# Patient Record
Sex: MALE | Race: WHITE | NOT HISPANIC OR LATINO | Employment: OTHER | URBAN - NONMETROPOLITAN AREA
[De-identification: names, ages, dates, MRNs, and addresses within clinical notes are randomized per-mention and may not be internally consistent; named-entity substitution may affect disease eponyms.]

---

## 2024-11-09 ENCOUNTER — OFFICE VISIT (OUTPATIENT)
Dept: URGENT CARE | Facility: PHYSICIAN GROUP | Age: 84
End: 2024-11-09
Payer: MEDICARE

## 2024-11-09 VITALS
HEIGHT: 68 IN | WEIGHT: 178.6 LBS | HEART RATE: 90 BPM | TEMPERATURE: 97.7 F | SYSTOLIC BLOOD PRESSURE: 140 MMHG | RESPIRATION RATE: 20 BRPM | DIASTOLIC BLOOD PRESSURE: 98 MMHG | BODY MASS INDEX: 27.07 KG/M2 | OXYGEN SATURATION: 98 %

## 2024-11-09 DIAGNOSIS — I10 HYPERTENSION, UNSPECIFIED TYPE: Primary | ICD-10-CM

## 2024-11-09 DIAGNOSIS — I10 ELEVATED BLOOD PRESSURE READING IN OFFICE WITH DIAGNOSIS OF HYPERTENSION: ICD-10-CM

## 2024-11-09 DIAGNOSIS — E78.5 HYPERLIPIDEMIA, UNSPECIFIED HYPERLIPIDEMIA TYPE: ICD-10-CM

## 2024-11-09 PROCEDURE — 3080F DIAST BP >= 90 MM HG: CPT

## 2024-11-09 PROCEDURE — 99204 OFFICE O/P NEW MOD 45 MIN: CPT

## 2024-11-09 PROCEDURE — 3077F SYST BP >= 140 MM HG: CPT

## 2024-11-09 RX ORDER — LOVASTATIN 10 MG/1
10 TABLET ORAL NIGHTLY
COMMUNITY
End: 2024-11-09 | Stop reason: SDUPTHER

## 2024-11-09 RX ORDER — LOSARTAN POTASSIUM 100 MG/1
100 TABLET ORAL DAILY
Qty: 30 EACH | Refills: 0 | Status: SHIPPED | OUTPATIENT
Start: 2024-11-09 | End: 2024-12-09

## 2024-11-09 RX ORDER — AMLODIPINE BESYLATE 5 MG/1
5 TABLET ORAL DAILY
COMMUNITY
End: 2024-11-09 | Stop reason: SDUPTHER

## 2024-11-09 RX ORDER — LOSARTAN POTASSIUM 100 MG/1
100 TABLET ORAL DAILY
COMMUNITY
End: 2024-11-09 | Stop reason: SDUPTHER

## 2024-11-09 RX ORDER — LOVASTATIN 10 MG/1
10 TABLET ORAL NIGHTLY
Qty: 30 TABLET | Refills: 0 | Status: SHIPPED | OUTPATIENT
Start: 2024-11-09 | End: 2024-12-09

## 2024-11-09 RX ORDER — AMLODIPINE BESYLATE 5 MG/1
5 TABLET ORAL DAILY
Qty: 30 TABLET | Refills: 0 | Status: SHIPPED | OUTPATIENT
Start: 2024-11-09 | End: 2024-12-09

## 2024-11-09 ASSESSMENT — ENCOUNTER SYMPTOMS
EYE REDNESS: 0
STRIDOR: 0
DIARRHEA: 0
HEADACHES: 0
VOMITING: 0
EYE DISCHARGE: 0
NAUSEA: 0
SORE THROAT: 0
WHEEZING: 0
FEVER: 0
DIZZINESS: 0
COUGH: 0
PALPITATIONS: 0
EYE PAIN: 0
SHORTNESS OF BREATH: 0
SPUTUM PRODUCTION: 0
MYALGIAS: 0
CHILLS: 0
ABDOMINAL PAIN: 0

## 2024-11-09 NOTE — PROGRESS NOTES
Subjective:   Lucas Garcia is a 84 y.o. male who presents for Medication Refill (Amlodipine 5 mg/Lovastatin 10 mg /Losartan 100 mg /Pt has been out week )          I introduced myself to the patient and informed them that I am a Family Nurse Practitioner.    HPI: Lucas is an 84-year-old male with a history of hyperlipidemia, hypertension who comes in today c/o has run out of his blood pressure and cholesterol medications about a week ago, tried to get them refilled however he was told by his doctors office that his PCP had left the practice.  He states he was not able to get an appointment with anyone else there until March of next year.  Patient describes symptoms as none, states he feels well and feels at baseline, he just needs his medications refilled and needs to find a new PCP.  Previously the meds were prescribed and refilled by Dr. Adalberto Coffman MD at [facility], has been taking these meds since 01/21/2022.  He does bring in the medication bottles to verify prescriptions.  He denies any headache, neurological deficits, chest pain, shortness of breath, lethargy.      Review of Systems   Constitutional:  Negative for chills, fever and malaise/fatigue.   HENT:  Negative for congestion, ear pain and sore throat.    Eyes:  Negative for pain, discharge and redness.   Respiratory:  Negative for cough, sputum production, shortness of breath, wheezing and stridor.    Cardiovascular:  Negative for chest pain and palpitations.   Gastrointestinal:  Negative for abdominal pain, diarrhea, nausea and vomiting.   Genitourinary:  Negative for dysuria.   Musculoskeletal:  Negative for myalgias.   Skin:  Negative for rash.   Neurological:  Negative for dizziness and headaches.       Medications: amLODIPine Tabs  capecitabine  COMPAZINE PO  hydroCHLOROthiazide Tabs  lisinopril Tabs  losartan Tabs  lovastatin  metoprolol tartrate Tabs  niacin SR Tbcr     Allergies: Patient has no known allergies.    Problem List: does not  "have any pertinent problems on file.    Surgical History:  Past Surgical History:   Procedure Laterality Date    COLECTOMY      TONSILLECTOMY         Past Social Hx:   reports that he has quit smoking. His smoking use included cigarettes. He does not have any smokeless tobacco history on file.     Past Family Hx:   family history is not on file.     Problem list, medications, and allergies reviewed by myself today in Epic.   I have documented what I find to be significant in regards to past medical, social, family and surgical history  in my HPI or under PMH/PSH/FH review section, otherwise it is noncontributory     Objective:     BP (!) 140/98   Pulse 90   Temp 36.5 °C (97.7 °F) (Temporal)   Resp 20   Ht 1.727 m (5' 8\")   Wt 81 kg (178 lb 9.6 oz)   SpO2 98%   BMI 27.16 kg/m²     During this visit, appropriate PPE was worn, and hand hygiene was performed.    Physical Exam  Vitals reviewed.   Constitutional:       General: He is not in acute distress.     Appearance: Normal appearance. He is not ill-appearing or toxic-appearing.   HENT:      Head: Normocephalic and atraumatic.      Right Ear: External ear normal.      Left Ear: External ear normal.      Nose: No congestion or rhinorrhea.   Eyes:      General: No scleral icterus.        Right eye: No discharge.         Left eye: No discharge.      Extraocular Movements: Extraocular movements intact.      Conjunctiva/sclera: Conjunctivae normal.   Cardiovascular:      Rate and Rhythm: Normal rate and regular rhythm.      Heart sounds: Normal heart sounds. No murmur heard.     No friction rub. No gallop.   Pulmonary:      Effort: Pulmonary effort is normal. No respiratory distress.      Breath sounds: Normal breath sounds. No stridor. No wheezing, rhonchi or rales.   Abdominal:      General: There is no distension.      Palpations: Abdomen is soft.   Musculoskeletal:         General: Normal range of motion.      Cervical back: Normal range of motion. No rigidity. "      Right lower leg: No edema.      Left lower leg: No edema.   Skin:     General: Skin is warm and dry.      Capillary Refill: Capillary refill takes 2 to 3 seconds.      Coloration: Skin is not jaundiced.   Neurological:      General: No focal deficit present.      Mental Status: He is alert and oriented to person, place, and time. Mental status is at baseline.      Gait: Gait normal.   Psychiatric:         Mood and Affect: Mood normal.         Behavior: Behavior normal.         Thought Content: Thought content normal.         Judgment: Judgment normal.         Assessment/Plan:     Diagnosis and associated orders:     1. Hypertension, unspecified type  amLODIPine (NORVASC) 5 MG Tab    losartan (COZAAR) 100 MG Tab    Referral to establish with PCP      2. Hyperlipidemia, unspecified hyperlipidemia type  lovastatin (MEVACOR) 10 MG tablet    Referral to establish with PCP      3. Elevated blood pressure reading in office with diagnosis of hypertension           Comments/MDM:     1. Hypertension, unspecified type (Primary)  - amLODIPine (NORVASC) 5 MG Tab; Take 1 Tablet by mouth every day for 30 days.  Dispense: 30 Tablet; Refill: 0  - losartan (COZAAR) 100 MG Tab; Take 1 Tablet by mouth every day for 30 days.  Dispense: 30 Each; Refill: 0  - Referral to establish with PCP    2. Hyperlipidemia, unspecified hyperlipidemia type  - lovastatin (MEVACOR) 10 MG tablet; Take 1 Tablet by mouth every evening for 30 days.  Dispense: 30 Tablet; Refill: 0  - Referral to establish with PCP    3. Elevated blood pressure reading in office with diagnosis of hypertension  Instructed patient's I will refill his blood pressure medications and his cholesterol medication for 30 days, discussed with him I have made an urgent referral for him to  establish with a new PCP, gave them a copy of the referral with the number to call and encouraged him to call to make an appointment.  Encouraged him to be flexible and asked to be placed on a  cancellation list.    check up to date for any dosing considerations for renal or hepatic impairment, none of these medications refilled today require any dosing adjustment for any degree of renal or hepatic impairment so I did defer blood work.  Discussed this with patient, he states he is agreeable would like to wait until he gets a new PCP before getting labs done.    I did I instructed patient regarding the long-term risks of having uncontrolled high blood pressure including cardiovascular disease, increased risk of PVD, MI, CVA. I instructed patient to get a home BP monitor from pharmacy, check their blood pressure twice a day, morning and evening, keep a written log, make an appointment to see PCP and take the log of blood pressures with them for PCP to review to decide whether antihypertensive therapy is indicated.  Patient states they understand instructions and will do so.   - Referral to establish with PCP         Pt is clinically stable at today's acute urgent care visit. Vital signs are normal and reassuring.  No acute distress noted. Appropriate for outpatient management at this time.        I personally reviewed prior external notes and test results pertinent to today's visit.  I have independently reviewed and interpreted all diagnostics ordered during this urgent care acute visit.        Please note that this dictation was created using voice recognition software. I have made a reasonable attempt to correct obvious errors, but I expect that there are errors of grammar and possibly content that I did not discover before finalizing the note.    This note was electronically signed by Wesly ROSAS, THANG, YOLANDA, JAYSON